# Patient Record
(demographics unavailable — no encounter records)

---

## 2017-07-20 NOTE — ED PDOC
HPI: Abdomen





<Cheyenne Vidal A - Last Filed: 07/20/17 23:45>


Chief Complaint (Provider): Abdominal Pain


History Per: Patient


History/Exam Limitations: no limitations


Onset/Duration Of Symptoms: Days (x1 )


Current Symptoms Are (Timing): Still Present





<Khai Garcia - Last Filed: 07/20/17 23:53>


Time Seen by Provider: 07/20/17 21:31


Chief Complaint (Nursing): Abdominal Pain


Additional Complaint(s): 





Helen Mendoza is a 41 year old female who presents to the emergency 

department with a complaint of pelvic pain associated with vaginal bleeding and 

headache since this morning. Denied fever, chills, urinary complaints, or 

taking medication for symptom relief.


Of note, patient is currently 7 weeks pregnant with history of normal, vaginal 

births. G3,P2. Patients bleeding was resolved upon arrival to the ED.





PMD: David Ley MD (Khai Garcia Y)





Past Medical History





<Cheyenne Vidal A - Last Filed: 07/20/17 23:45>


Reviewed: Historical Data, Nursing Documentation, Vital Signs





- Medical History


PMH: No Chronic Diseases


   Denies: Diabetes





- Surgical History


Surgical History: No Surg Hx





- Family History


Family History: States: Unknown Family Hx





<Khai Garcia Y - Last Filed: 07/20/17 23:53>


Vital Signs: 


 Last Vital Signs











Temp  98.1 F   07/20/17 21:19


 


Pulse  62   07/20/17 21:19


 


Resp  16   07/20/17 21:19


 


BP  114/54 L  07/20/17 21:19


 


Pulse Ox  100   07/20/17 21:57














- Home Medications


Home Medications: 


 Ambulatory Orders











 Medication  Instructions  Recorded


 


Prenatal Multivit/Folic Acid/I 1 tab PO DAILY 07/20/17





[Prenatal Plus]  














- Allergies


Allergies/Adverse Reactions: 


 Allergies











Allergy/AdvReac Type Severity Reaction Status Date / Time


 


No Known Allergies Allergy   Verified 06/06/16 23:27














Review of Systems


ROS Statement: Except As Marked, All Systems Reviewed And Found Negative


Constitutional: Negative for: Fever, Chills


Genitourinary Female: Positive for: Vaginal Bleeding, Pelvic Pain.  Negative for

: Dysuria, Hematuria


Neurological: Positive for: Headache





<Khai Garcia Y - Last Filed: 07/20/17 23:53>





Physical Exam





- Reviewed


Nursing Documentation Reviewed: Yes


Vital Signs Reviewed: Yes





- Physical Exam


Appears: Positive for: Well, Non-toxic, No Acute Distress


Head Exam: Positive for: ATRAUMATIC, NORMAL INSPECTION, NORMOCEPHALIC


Skin: Positive for: Normal Color


Cardiovascular/Chest: Positive for: Regular Rate, Rhythm


Respiratory: Positive for: Normal Breath Sounds.  Negative for: Crackles, Rales

, Rhonchi, Wheezing


Gastrointestinal/Abdominal: Positive for: Normal Exam, Bowel Sounds, Soft, 

Tenderness (pelvis)


Extremity: Positive for: Normal ROM.  Negative for: Tenderness, Pedal Edema


Neurologic/Psych: Positive for: Alert, CNs II-XII, Oriented





<Khai Garcia Y - Last Filed: 07/20/17 23:53>





- Laboratory Results


Result Diagrams: 


 07/20/17 21:53





 07/20/17 21:53





<Cheyenne Vidal - Last Filed: 07/20/17 23:45>





- Laboratory Results


Result Diagrams: 


 07/20/17 21:53





 07/20/17 21:53





- ECG


O2 Sat by Pulse Oximetry: 100 (RA)


Pulse Ox Interpretation: Normal





<Khai Garcia Y - Last Filed: 07/20/17 23:53>





Medical Decision Making





<Cheyenne Vidal A - Last Filed: 07/20/17 23:45>





<Khai Garcia Y - Last Filed: 07/20/17 23:53>


Medical Decision Making: 





Initial Impression: Vaginal bleeding; pregnancy





Initial Plan:


* Type and screen


* Beta-HCG


* Labs


* Urine C&S


* Admit to hospital


* Urinalysis


* US OB preg & transvag





--------------------------------------------------------------------------------

-----------------


Scribe Attestation:


Documented by Michelle Nicholson, acting as a scribe for Khai Garcia MD.





Provider Scribe Attestation:


All medical record entries made by the Scribe were at my direction and 

personally dictated by me. I have reviewed the chart and agree that the record 

accurately reflects my personal performance of the history, physical exam, 

medical decision making, and the department course for this patient. I have 

also personally directed, reviewed, and agree with the discharge instructions 

and disposition.





--------------------------------------------------------------------------------

------------------


[id number] Report


Addendum created by Hay Garza MD on 7/20/2017 11:28 PM Eastern Time ([place] & 

[place])


THIS REPORT CONTAINS FINDINGS THAT MAY BE CRITICAL TO PATIENT CARE. The


findings were verbally communicated via telephone conference with Khai Morales at


11:28 PM EDT on 7/20/2017. The findings were acknowledged and understood.


Initial Report created on 7/20/2017 11:26 PM Eastern Time ([place] & [place])





EXAM:


US Pregnancy, Transvaginal


CLINICAL HISTORY:


41 years old, female; Pain; Pregnancy complicated by abdominal or pelvic pain; 

Lower; First


trimester; Gestational age or lmp: 05/05/2016; Pregnant; Additional info: 

Pregnancy pain


TECHNIQUE:


Real-time transvaginal obstetrical ultrasound of the maternal pelvis and a 

first trimester pregnancy


with image documentation. Transvaginal imaging was used for better evaluation 

of the fetus and


adnexa.


COMPARISON:


No relevant prior studies available.


FINDINGS:


Gestation: Gestational sac. No yolk sac. Fetal pole. Crown-rump length of 1.6 cm

, correlating with


gestational age of 8 weeks 0 days. No fetal heartbeat detected.


Uterus/cervix: 0.6 x 0.3 x 0.5 cm collection along gestational sac, possibly 

subchorionic hemorrhage.


No cervical dilatation or effacement.


Ovaries: RIGHT ovary: Normal. LEFT ovary: 1.1 x 1.3 x 1.1 cm anechoic lesion. 

No adnexal


masses.


Free fluid: No significant free fluid.


IMPRESSION:


1. Findings compatible with fetal demise.


2. LEFT ovarian cyst.


3. Incidental/non-acute findings are described above





2330 Re-Evaluation:


Diagnosis: Fetal Demise. Patients cervix is closed, hemoglobin is stable, urine 

presents no infection, and the patient is RH positive. The patient has been 

given bleeding precautions and instructed to follow-up with OBGYN in two days. 

PT has also been made aware of ultrasound report.


2330: Discharge


Discussed results and plan with patient who expresses understanding. Counseling 

was provided regarding the diagnosis and prognosis. All questions answered and 

there is agreement with the plan to discharge home with instructions. Patient 

stable for discharge. Return if symptoms persist or worsen.


_________________________________________________________________________





Scribe Attestation:


Documented by Daniella Matamoros, acting as a scribe for Khai Garcia MD.





MD Scribe Attestation:


All medical record entries made by the Scribe were at my direction and 

personally dictated by me. I have reviewed the chart and agree that the record 

accurately reflects my personal performance of the history, physical exam, 

medical decision making, and the department course for this patient. I have 

also personally directed, reviewed, and agree with the discharge instructions 

and disposition.


 (Khai Garcia)





Disposition





<Cheyenne Vidal - Last Filed: 07/20/17 23:45>





- Patient ED Disposition


Is Patient to be Admitted: No





- Disposition


Disposition: Routine/Home


Disposition Time: 23:30





<Khai Garcia - Last Filed: 07/20/17 23:53>





- Clinical Impression


Clinical Impression: 


 Fetal demise








- Disposition


Condition: IMPROVED

## 2017-07-20 NOTE — US
EXAM:

  US Pregnancy, Transvaginal



CLINICAL HISTORY:

  41 years old, female; Pain; Pregnancy complicated by abdominal or pelvic 

pain; Lower; First trimester; Gestational age or lmp: 05/05/2016; Pregnant; 

Additional info: Pregnancy pain



TECHNIQUE:

  Real-time transvaginal obstetrical ultrasound of the maternal pelvis and a 

first trimester pregnancy with image documentation.  Transvaginal imaging was 

used for better evaluation of the fetus and adnexa.



COMPARISON:

  No relevant prior studies available.



FINDINGS:

  Gestation:  Gestational sac.  No yolk sac.  Fetal pole.  Crown-rump length of 

1.6 cm, correlating with gestational age of 8 weeks 0 days.  No fetal heartbeat 

detected.

  Uterus/cervix:  0.6 x 0.3 x 0.5 cm collection along gestational sac, possibly 

subchorionic hemorrhage.  No cervical dilatation or effacement.

  Ovaries:  RIGHT ovary: Normal.  LEFT ovary: 1.1 x 1.3 x 1.1 cm anechoic 

lesion.  No adnexal masses.

  Free fluid:  No significant free fluid.



IMPRESSION:     

1.  Findings compatible with fetal demise.  

2.  LEFT ovarian cyst.

3.  Incidental/non-acute findings are described above.

## 2017-07-22 NOTE — ED PDOC
HPI: Female  Pain


Time Seen by Provider: 17 16:54


Chief Complaint (Nursing): Female Genitourinary


Chief Complaint (Provider): vaginal bleeding


History Per: Patient


History/Exam Limitations: no limitations


Onset/Duration Of Symptoms: Days (4), Persistent, Worse Since (9am)


Quality Of Discomfort: Cramping


Associated Symptoms: denies: Fever, Chills, Nausea, Vomiting, Diarrhea, Urinary 

Symptoms


Additional Complaint(s): 





10wks pregnant with recent visits to ER for vaginal bleeding and pelvic pain. 

Had been told that she is having a miscarriage.


Today at 9am bleeding suddenly more heavy with clots, and associated with more 

severe pain


No lightheadedness. No fainting. Used 4 sanitary pads so far. Has not f/u with 

OB yet.





OB [place]





Past Medical History


Reviewed: Historical Data, Nursing Documentation, Vital Signs


Vital Signs: 





 Last Vital Signs











Temp  98.8 F   17 16:39


 


Pulse  75   17 16:39


 


Resp  16   17 16:39


 


BP  123/57 L  17 16:39


 


Pulse Ox  98   17 16:39














- Medical History


PMH: No Chronic Diseases





- Surgical History


Surgical History: No Surg Hx





- Family History


Family History: States: Unknown Family Hx





- Social History


Current smoker - smoking cessation education provided: No





- Home Medications


Home Medications: 


 Ambulatory Orders











 Medication  Instructions  Recorded


 


Prenatal Multivit/Folic Acid/I 1 tab PO DAILY 17





[Prenatal Plus]  


 


Ibuprofen [Motrin Tab] 600 mg PO Q8 PRN #60 tab 17














- Allergies


Allergies/Adverse Reactions: 


 Allergies











Allergy/AdvReac Type Severity Reaction Status Date / Time


 


No Known Allergies Allergy   Verified 16 23:27














Review of Systems


ROS Statement: Except As Marked, All Systems Reviewed And Found Negative (and 

as per HPI)


Gastrointestinal: Positive for: Abdominal Pain.  Negative for: Nausea, Vomiting

, Diarrhea


Genitourinary Female: Positive for: Vaginal Bleeding, Pelvic Pain





Physical Exam





- Reviewed


Nursing Documentation Reviewed: Yes


Vital Signs Reviewed: Yes





- Physical Exam


Appears: Positive for: Non-toxic, In Acute Distress


Head Exam: Positive for: ATRAUMATIC, NORMOCEPHALIC


Skin: Positive for: Warm, Dry.  Negative for: Pallor


Eye Exam: Positive for: EOMI, PERRL


ENT: Positive for: Normal ENT Inspection


Neck: Positive for: Painless ROM, Supple


Cardiovascular/Chest: Positive for: Regular Rate, Rhythm.  Negative for: Murmur


Respiratory: Negative for: Accessory Muscle Use, Respiratory Distress


Gastrointestinal/Abdominal: Positive for: Bowel Sounds, Soft, Tenderness.  

Negative for: Mass, Distended, Guarding


Pelvic Exam: Positive for: Active Bleeding, Blood (heavy with clots cervic open)

, Tender Uterus


Back: Positive for: Normal Inspection


Extremity: Negative for: Pedal Edema, Deformity


Neurologic/Psych: Positive for: Alert.  Negative for: Motor/Sensory Deficits





- Laboratory Results


Result Diagrams: 


 17 17:44





 17 17:44





- ECG


O2 Sat by Pulse Oximetry: 98





- Progress


ED Course And Treament: 





 EXAM:


US Pregnancy, Transvaginal


CLINICAL HISTORY:


41 years old female; Signs and symptoms; Lmp or gestational age (in weeks): 

Lmp. .; Other:


Heavy vag bleed and pregnant; Patient HX: Exam 2 days ago uploaded to [employer]; 

Additional info:


Heavy vaginal bleed R/O missed ab


TECHNIQUE:


Real-time transvaginal obstetrical ultrasound of the maternal pelvis and a 

first trimester pregnancy


with image documentation. Transvaginal imaging was used for better evaluation 

of the endometrial


contents and adnexa.


COMPARISON:


OB ultrasound dated 2017.


FINDINGS:


Uterus:


Uterus is anteverted. It is mildly enlarged and measures approximately 10.8 x 

6.4 x 6 cm.


The endometrium is thickened and heterogeneous and measures 21.8 mm. No 

significant vascularity


is seen by color flow ultrasound. Findings most likely represent blood clot. No 

findings to suggest


retained products of conception.


Cervix: Unremarkable


Gestation:


The previously noted intrauterine gestational sac and embryo are no longer seen 

consistent with


interval spontaneous .


Free fluid:


There is no free fluid in the cul-de-sac.


Ovaries/adnexa:


The ovaries were not visualized, obscured by bowel gas.


IMPRESSION:


Interval spontaneous  with evacuation of the gestational sac and embryo.


Thickened endometrium without evidence of increased vascularity. Findings 

consistent with blood


clot.


Ovaries not visualized.


Thank you for allowing us to participate in the care of your patient.


Dictated and Authenticated by: Oscar Pyle MD


2017 8:11 PM Eastern Time ([place] & [place])


 











Disposition





- Clinical Impression


Clinical Impression: 


 Miscarriage





Counseled Patient/Family Regarding: Studies Performed, Diagnosis, Need For 

Followup, Rx Given





- Disposition


[facility]errals[facility] 


[facility]


Disposition: Routine/Home


Disposition Time: 21:00


Condition: STABLE


Prescriptions: 


Ibuprofen [Motrin Tab] 600 mg PO Q8 PRN #60 tab


 PRN Reason: Pain, Moderate (4-7)


Instructions:  Spontaneous Miscarriage (ED)


Forms:  [facility] ED School/Work Excuse


Print Language: Azeri

## 2017-07-23 NOTE — US
PROCEDURE:  



HISTORY:

heavy vaginal bleed r/o missed ab



COMPARISON:

 2017 



TECHNIQUE:





FINDINGS:

Diffuse thickening of the endometrium measuring 22 millimeters 

without vascularity suggestive of hemorrhagic thrombus. No definite 

evidence of products of conception. No evidence of intrauterine 

gestational sac compatible with spontaneous . The ovaries are 

not visualized. There is no free fluid the pelvis.



IMPRESSION:

As above.

## 2018-02-19 NOTE — CP.PCM.HP
History of Present Illness





- History of Present Illness


History of Present Illness: 





This is a 42 year old female with  no significant past medical history who 

presents to the ED with the complaint of fever, chills, generalized abdominal 

pain, and weakness getting progressively worse. The patient states that the 

pain is crampy, severe 9/10 intensity, generalized, with watery green nonbloody 

diarrhea x 3 days. LMP 2/17/2018





In the ED, the patient was found to be febrile with a temp of 101.3, 

tachycardia of 114, appearing acutely ill. Labwork shows WBC of 10.3, Hg 11.0, 

plt count of 231, Neutrophilia 88.6%, neutrophil count of 9.1, Band neutrophils 

of 10%. CT scan abdomen and pelvis reveals diffuse mural thickening and fluid, 

consistent with pancolitis. Given the patient's vitals she was also diagnosed 

with sepsis. Patient to be admitted to telemetry for further workup. 





Present on Admission





- Present on Admission


Any Indicators Present on Admission: No


History of DVT/PE: No


History of Uncontrolled Diabetes: No





Review of Systems





- Review of Systems


Review of Systems: 





A 12 point review of systems was conducted and found to be negative other than 

what was documented in the HPI. 





Past Patient History





- Infectious Disease


Hx of Infectious Diseases: None





- Past Social History


Smoking Status: Light Smoker < 10 Cigarettes Daily





- PSYCHIATRIC


Hx Substance Use: No





- SURGICAL HISTORY


Hx Surgeries: No





- ANESTHESIA


Hx Anesthesia: No





Meds


Allergies/Adverse Reactions: 


 Allergies











Allergy/AdvReac Type Severity Reaction Status Date / Time


 


No Known Allergies Allergy   Verified 06/06/16 23:27














Physical Exam





- Additional Findings


Additional findings: 








Physical exam:





Constitutional- cooperative, awake, alert


Head- NCAT, PERRL


Eye- PERRL, EOMI


ENT- normal exam, MMM.


Neck- normal inspection, supple, no JVD


Respiratory- CTAB, no wheezes rales rhonchi


Cardiovascular- RRR, +S1, +S2 no MRG


GI/Abdominal- normal bowel sounds, soft, + Tender to palpation diffusely, 

protuberant, nondistended, no mass, no hsm


Skin- warm, dry


Extremities Exam- normal capillary refill, normal inspection


Neurological Exam- alert, awake, oriented


Psych- normal mood, normal affect





Results





- Vital Signs


Recent Vital Signs: 





 Last Vital Signs











Temp  99.5 F   02/19/18 19:11


 


Pulse  114 H  02/19/18 14:40


 


Resp  18   02/19/18 14:40


 


BP  138/85   02/19/18 14:40


 


Pulse Ox  98   02/19/18 19:00














- Labs


Result Diagrams: 


 02/19/18 16:01





 02/19/18 16:01


Labs: 





 Laboratory Results - last 24 hr











  02/19/18 02/19/18 02/19/18





  16:01 16:01 16:01


 


WBC  10.3  


 


RBC  4.42  


 


Hgb  11.0 L  


 


Hct  34.2  


 


MCV  77.2 L D  


 


MCH  25.0 L  


 


MCHC  32.3 L  


 


RDW  16.3 H  


 


Plt Count  231  


 


MPV  8.3  


 


Neut % (Auto)  88.6 H  


 


Lymph % (Auto)  7.3 L  


 


Mono % (Auto)  4.0  


 


Eos % (Auto)  0.0  


 


Baso % (Auto)  0.1  


 


Neut # (Auto)  9.1 H  


 


Lymph # (Auto)  0.8 L  


 


Mono # (Auto)  0.4  


 


Eos # (Auto)  0.0  


 


Baso # (Auto)  0.0  


 


Neutrophils % (Manual)  72  


 


Band Neutrophils %  10 H  


 


Lymphocytes % (Manual)  14 L  


 


Monocytes % (Manual)  3  


 


Eosinophils % (Manual)  1  


 


Toxic Granulation  Present  


 


Platelet Estimate  Normal  


 


Polychromasia  Slight  


 


Hypochromasia (manual)  Slight  


 


Poikilocytosis (manual  Slight  


 


Anisocytosis (manual)  Slight  


 


Spherocytes  Slight  


 


Target Cells  Slight  


 


Tear Drop Cells  Slight  


 


Ovalocytes  Slight  


 


Stomatocytes  Slight  


 


pO2   


 


VBG pH   


 


VBG pCO2   


 


VBG HCO3   


 


VBG Total CO2   


 


VBG O2 Sat (Calc)   


 


VBG Base Excess   


 


VBG Potassium   


 


Glucose   


 


Lactate   


 


FiO2   


 


Sodium   138 


 


Potassium   4.0 


 


Chloride   104 


 


Carbon Dioxide   22 


 


Anion Gap   16 


 


BUN   17 


 


Creatinine   0.9 


 


Est GFR ( Amer)   > 60 


 


Est GFR (Non-Af Amer)   > 60 


 


Random Glucose   124 H 


 


Calcium   9.0 


 


Total Bilirubin   0.6 


 


AST   21 


 


ALT   32 


 


Alkaline Phosphatase   76 


 


Total Protein   7.8 


 


Albumin   4.2 


 


Globulin   3.6 


 


Albumin/Globulin Ratio   1.2 


 


Lipase   37 


 


Venous Blood Potassium   


 


Urine Color   


 


Urine Clarity   


 


Urine pH   


 


Ur Specific Gravity   


 


Urine Protein   


 


Urine Glucose (UA)   


 


Urine Ketones   


 


Urine Blood   


 


Urine Nitrate   


 


Urine Bilirubin   


 


Urine Urobilinogen   


 


Ur Leukocyte Esterase   


 


Urine RBC (Auto)   


 


Urine Microscopic WBC   


 


Ur Squamous Epith Cells   


 


Urine Bacteria   


 


Hyaline Casts   


 


Granular Casts (Auto)   


 


Alcohol, Quantitative   < 10 


 


Influenza Typ A,B (EIA)    Negative for flu a/b














  02/19/18 02/19/18





  16:05 18:20


 


WBC  


 


RBC  


 


Hgb  


 


Hct  


 


MCV  


 


MCH  


 


MCHC  


 


RDW  


 


Plt Count  


 


MPV  


 


Neut % (Auto)  


 


Lymph % (Auto)  


 


Mono % (Auto)  


 


Eos % (Auto)  


 


Baso % (Auto)  


 


Neut # (Auto)  


 


Lymph # (Auto)  


 


Mono # (Auto)  


 


Eos # (Auto)  


 


Baso # (Auto)  


 


Neutrophils % (Manual)  


 


Band Neutrophils %  


 


Lymphocytes % (Manual)  


 


Monocytes % (Manual)  


 


Eosinophils % (Manual)  


 


Toxic Granulation  


 


Platelet Estimate  


 


Polychromasia  


 


Hypochromasia (manual)  


 


Poikilocytosis (manual  


 


Anisocytosis (manual)  


 


Spherocytes  


 


Target Cells  


 


Tear Drop Cells  


 


Ovalocytes  


 


Stomatocytes  


 


pO2   65 H


 


VBG pH   7.43


 


VBG pCO2   32 L


 


VBG HCO3   23.0


 


VBG Total CO2   22.2


 


VBG O2 Sat (Calc)   95.2 H


 


VBG Base Excess   -2.3 L


 


VBG Potassium   3.3 L


 


Glucose   113 H


 


Lactate   0.7


 


FiO2   21.0


 


Sodium   135.0


 


Potassium  


 


Chloride   107.0


 


Carbon Dioxide  


 


Anion Gap  


 


BUN  


 


Creatinine  


 


Est GFR ( Amer)  


 


Est GFR (Non-Af Amer)  


 


Random Glucose  


 


Calcium  


 


Total Bilirubin  


 


AST  


 


ALT  


 


Alkaline Phosphatase  


 


Total Protein  


 


Albumin  


 


Globulin  


 


Albumin/Globulin Ratio  


 


Lipase  


 


Venous Blood Potassium   3.3 L


 


Urine Color  Yellow 


 


Urine Clarity  Cloudy 


 


Urine pH  6.0 


 


Ur Specific Gravity  1.028 


 


Urine Protein  100 


 


Urine Glucose (UA)  Neg 


 


Urine Ketones  Negative 


 


Urine Blood  Moderate 


 


Urine Nitrate  Negative 


 


Urine Bilirubin  Negative 


 


Urine Urobilinogen  0.2-1.0 


 


Ur Leukocyte Esterase  Neg 


 


Urine RBC (Auto)  49 H 


 


Urine Microscopic WBC  4 


 


Ur Squamous Epith Cells  1 


 


Urine Bacteria  Rare 


 


Hyaline Casts  3-5 H 


 


Granular Casts (Auto)  2 


 


Alcohol, Quantitative  


 


Influenza Typ A,B (EIA)  














Assessment & Plan





- Assessment and Plan (Free Text)


Plan: 





ASSESSMENT/PLAN





1) Sepsis due to pancolitis


- Admit to telemetry


- Consultation with Dr. Welch, GI


- Regular diet


- IV fluids


- IV abx: Rocephin 1 gram IVPB q 12 hours and Flagyl 500 mg IVPB q 12 hours (

Vanc/Zosyn given in ED)


- Morphine PRN for abdominal pain


- Zofran PRN N/V


- Blood culture, UCX, Stool CX, WBC stool, C. diff toxin A&B


- Tylenol PRN for fever


- Repeat labs in AM


- F/u GI in AM





2) Obesity


BMI 34.9


Chronic





3) DVT prophylaxis


- Heparin SQ

## 2018-02-19 NOTE — CT
PROCEDURE:  CT Abdomen and Pelvis with contrast



HISTORY:

Epigastric,LLQ abd pain



COMPARISON:

Abdomen pelvis CT with contrast 02/09/2015.



TECHNIQUE:

Following the intravenous administration of iodinated contrast 

material, a CT examination of the abdomen and pelvis performed from 

the domes of the diaphragms to the symphysis pubis with reformatted 

datasets provided not only axial but also sagittal and coronal 

planes. Oral contrast was not administered as per referring physician 

request.



Contrast dose: Omnipaque 300, 95 cc



Radiation dose:



Total exam DLP = 1034.78 mGy-cm.



This CT exam was performed using one or more of the following dose 

reduction techniques: Automated exposure control, adjustment of the 

mA and/or kV according to patient size, and/or use of iterative 

reconstruction technique.



FINDINGS:



LOWER THORAX:

Heart appears prominent. No pleural or pericardial effusion 

identified. 



LIVER:

Diffuse fatty infiltration of the liver is appreciate without 

discrete mass. 



GALLBLADDER AND BILE DUCTS:

Trace radiodense cholelithiasis is now identified in the gallbladder 

lumen versus minimal anterior gallbladder wall calcification. 

Moderate gallbladder distention is appreciate without pericholecystic 

fluid collection or mural thickening. 



PANCREAS:

Unremarkable. No gross lesion or ductal dilatation.



SPLEEN:

Unremarkable. 



ADRENALS:

Right adrenal calcifications are again appreciated.  Left adrenal 

gland remains normal appearing. 



KIDNEYS AND URETERS:

Unremarkable. No hydronephrosis. No solid mass. 



VASCULATURE:

Unremarkable. No aortic aneurysm. 



BOWEL:

Lack of oral contrast administration limits evaluation of the bowel. 



The stomach is decompressed with limited residual fluid in the lumen. 

No bowel obstruction or definite mesenteric edema is appreciated 

including the pericolic spaces.  However, fluid is seen throughout 

the large bowel somewhat more so on the right than left colon 

segments and mural thickening is suspected throughout the colon in a 

pattern that likely reflects pan colitis.  Clinically correlate 

further. 



APPENDIX:

Normal appendix. 



PERITONEUM:

Unremarkable. No free fluid. No free air. 



LYMPH NODES:

Unremarkable. No enlarged lymph nodes. 



BLADDER:

Urinary bladder is only mildly distended with limited evaluation of 

the wall resulting. 



REPRODUCTIVE:

Unremarkable. 



BONES:

No acute fracture. 



OTHER FINDINGS:

None.



IMPRESSION:

Pancolitis were near pancolitis is suspected involving the colon. No 

abscess, free intraperitoneal gas or ascites. No definite perinephric 

fluid collection. Retained fluid is seen at the right greater than 

left hemicolon with suspected mural thickening throughout the large 

bowel as discussed above. The lack of oral contrast administration 

limits evaluation of the bowel. Further clinical correlation is 

advised.



Hepatic steatosis.



Trace cholelithiasis.



Stable right adrenal calcifications. No obvious right adrenal mass.

## 2018-02-19 NOTE — ED PDOC
HPI: Abdomen


Chief Complaint (Provider): Abdominal pain 


History Per: Patient


History/Exam Limitations: no limitations


Onset/Duration Of Symptoms: Days (7), Intermittent Episodes


Current Symptoms Are (Timing): Still Present


Pain Scale Rating Of: 9


Location Of Pain/Discomfort: Diffuse


Quality Of Discomfort: Cramping


Associated Symptoms: Fever, Nausea, Diarrhea.  denies: Vomiting


Exacerbating Factors: None


Alleviating Factors: None





<Oliver Savage - Last Filed: 02/19/18 19:00>





<Cheyenne Vidal - Last Filed: 02/20/18 13:25>


Time Seen by Provider: 02/19/18 14:48


Chief Complaint (Nursing): Fever


Additional Complaint(s): 





43 yo ,f, no significant PMHx presents to ED c/o diffuse generalized abdominal 

pain started 7 days ago, cramping, 9/10 intensity, not radiated, associated 

with non-bloddy greenish  large diarrhea 7-8 episodes/day for the last 3 days 

and since yesterday flu like symptoms: occipital headache, nausea, myalgia, 

fever 101.2, sore throat, dry cough. She denies vomiting, dysuria, hematuria,

chest pain, palpitation, dizziness. Pt took  motrin this  morning w/o relief of 

symptoms. Pt had 5 drinks on saturday, denies drugs. LMP: 2/17/18


.   (Oliver Savage)





Supervising Attending Note





- Supervising Attending Note


The Documented history was done by the: Physician Extender, Attending Physician


The documented physical exam was done by the: Physician Extender, Attending 

Physician


The documented procedures were done by the: Physician Extender, Attending 

Physician





- Attestation:


I have personally seen and examined this patient.: Yes


I have fully participated in the care of the patient.: Yes


I have reviewed all pertinent clinical information: Yes





<Cheyenne Vidal - Last Filed: 02/20/18 13:25>





Past Medical History





- Medical History


PMH: 


   Denies: Diabetes





- Family History


Family History: States: Unknown Family Hx





<Oliver Savage - Last Filed: 02/19/18 19:00>


Reviewed: Historical Data, Nursing Documentation, Vital Signs





- Medical History


PMH: No Chronic Diseases





- Surgical History


Surgical History: No Surg Hx





<Cheyenne Vidal - Last Filed: 02/20/18 13:25>


Vital Signs: 





 Last Vital Signs











Temp  98.5 F   02/20/18 12:45


 


Pulse  80   02/20/18 12:45


 


Resp  16   02/20/18 12:45


 


BP  106/60   02/20/18 12:45


 


Pulse Ox  100   02/20/18 12:45














- Home Medications


Home Medications: 


 Ambulatory Orders











 Medication  Instructions  Recorded


 


Ibuprofen [Motrin Tab] 400 mg PO PRN PRN 02/20/18














- Allergies


Allergies/Adverse Reactions: 


 Allergies











Allergy/AdvReac Type Severity Reaction Status Date / Time


 


No Known Allergies Allergy   Verified 06/06/16 23:27














Review of Systems


Constitutional: Positive for: Fever


Respiratory: Positive for: Cough


Gastrointestinal: Positive for: Nausea, Abdominal Pain, Diarrhea.  Negative for

: Vomiting


Genitourinary Female: Negative for: Dysuria, Hematuria


Neurological: Negative for: Weakness





<Oliver Savage - Last Filed: 02/19/18 19:00>


ROS Statement: Except As Marked, All Systems Reviewed And Found Negative





<Cheyenne Vidal A - Last Filed: 02/20/18 13:25>





Physical Exam





- Physical Exam


Appears: Positive for: In Acute Distress (mild distress from pain )


Head Exam: Positive for: ATRAUMATIC, NORMOCEPHALIC


Skin: Positive for: Normal Color


Eye Exam: Positive for: Normal appearance


ENT: Positive for: Normal ENT Inspection


Neck: Positive for: Normal


Cardiovascular/Chest: Positive for: Regular Rate, Rhythm.  Negative for: Edema, 

Murmur


Respiratory: Positive for: Normal Breath Sounds.  Negative for: Crackles, Rales

, Rhonchi


Gastrointestinal/Abdominal: Positive for: Bowel Sounds (normal ), Soft, 

Tenderness (epigastric TD and LLQ).  Negative for: Distended, Guarding, Rebound


Back: Positive for: Normal Inspection.  Negative for: L CVA Tenderness, R CVA 

Tenderness





<Oliver Savage - Last Filed: 02/19/18 19:00>





- Reviewed


Nursing Documentation Reviewed: Yes


Vital Signs Reviewed: Yes





- Physical Exam


Appears: Positive for: Uncomfortable


Neurologic/Psych: Positive for: Alert, Oriented





<Cheyenne Vidal A - Last Filed: 02/20/18 13:25>





- Laboratory Results


Result Diagrams: 


 02/19/18 16:01





 02/19/18 16:01





- ECG


O2 Sat by Pulse Oximetry: 98





<Oliver Savage - Last Filed: 02/19/18 19:00>





- Laboratory Results


Result Diagrams: 


 02/20/18 05:55





 02/20/18 05:55





<Cheyenne Vidal - Last Filed: 02/20/18 13:25>


Medical Decision Making: 





15:15 pm


Patient evaluated for abdominal pain x 7 days, diarrheas the last 3 days and 

flu symptoms started yesterday


Initial impression


Abdominal pain


Influenza


URI





Deferential


-Acute viral gastroenteritis


-Acute pancreatitis


-Acute Diverticulitis





Plan:


Labs


CBC, CMP, UA, Lipase, Influenza test, Urine preg


Imaging


CT Abd w IV contrast





Meds:


IV fluids, Zofran, Pepcid, Morphine 4mg, Tylenol 650 mg





18:11 pm


Patient evaluated, reports abdominal pain is getting better with morphine, but 

still present 3/10.


CBC: WBC normal but neutrohilia 88.6, Bands 10, toxic granulation. UA hematuria 

secondary to menses


Pending CT Abd results.


18:55


CT Abd: showed pancolitis. Trace cholelithiasis. 


 


 (Oliver Savage)





Disposition





- Disposition


Disposition Time: 19:00





<Oliver Savage - Last Filed: 02/19/18 19:00>





- Patient ED Disposition


Is Patient to be Admitted: Transfer of Care


Counseled Patient/Family Regarding: Studies Performed, Diagnosis





- Disposition


Disposition Time: 17:00


Patient Signed Over To: Milan Ozuna





<Cheyenne Vidal - Last Filed: 02/20/18 13:25>





- Clinical Impression


Clinical Impression: 


 Pancolitis








- Disposition


Condition: STABLE

## 2018-02-20 NOTE — CP.PCM.CON
History of Present Illness





- History of Present Illness


History of Present Illness: 





41 yo female coming to the ER after 3 days of abdominal pain and diarrhea. 

Symptoms started 3 days ago and having 7 green watery bowel movements daily.





Review of Systems





- Constitutional


Constitutional: Fever





- EENT


Eyes: absent: Blurred Vision


Ears: absent: Decreased Hearing


Nose/Mouth/Throat: absent: Epistaxis





- Cardiovascular


Cardiovascular: absent: Chest Pain





- Respiratory


Respiratory: absent: Cough





- Gastrointestinal


Gastrointestinal: As Per HPI





- Genitourinary


Genitourinary: absent: Change in Urinary Stream





Past Patient History





- Infectious Disease


Hx of Infectious Diseases: None





- Past Social History


Smoking Status: Light Smoker < 10 Cigarettes Daily





- PSYCHIATRIC


Hx Substance Use: No





- SURGICAL HISTORY


Hx Surgeries: No





- ANESTHESIA


Hx Anesthesia: No





Meds


Allergies/Adverse Reactions: 


 Allergies











Allergy/AdvReac Type Severity Reaction Status Date / Time


 


No Known Allergies Allergy   Verified 06/06/16 23:27














- Medications


Medications: 


 Current Medications





Acetaminophen (Tylenol 325mg Tab)  650 mg PO Q6 PRN


   PRN Reason: Fever >100.4 F


   Last Admin: 02/19/18 21:40 Dose:  650 mg


Heparin Sodium (Porcine) (Heparin)  5,000 units SC Q12 SHAQ


   PRN Reason: Protocol


   Last Admin: 02/19/18 21:41 Dose:  5,000 units


Metronidazole (Flagyl 500mg/100ml Ns)  100 mls @ 100 mls/hr IVPB Q12 SHAQ


   PRN Reason: Protocol


   Last Admin: 02/20/18 08:10 Dose:  100 mls/hr


Ceftriaxone Sodium 1 gm/ (Sodium Chloride)  100 mls @ 100 mls/hr IVPB DAILY [facility]


   PRN Reason: Protocol


Sodium Chloride (Sodium Chloride 0.9%)  1,000 mls @ 150 mls/hr IV .Q6H40M [facility]


   Stop: 02/20/18 19:43


   Last Admin: 02/20/18 05:33 Dose:  150 mls/hr


Morphine Sulfate (Morphine)  2 mg IVP Q4 PRN


   PRN Reason: Pain, severe (8-10)


   Last Admin: 02/20/18 05:50 Dose:  2 mg


Ondansetron HCl (Zofran Inj)  4 mg IVP Q6 PRN


   PRN Reason: Nausea/Vomiting


   Last Admin: 02/20/18 05:54 Dose:  4 mg











Physical Exam





- Constitutional


Additional comments: 





Uncomfortable





- Head Exam


Head Exam: ATRAUMATIC





- Eye Exam


Eye Exam: Normal appearance





- ENT Exam


ENT Exam: Mucous Membranes Moist





- Neck Exam


Neck exam: Positive for: Normal Inspection





- Respiratory Exam


Respiratory Exam: Clear to Auscultation Bilateral





- Cardiovascular Exam


Cardiovascular Exam: REGULAR RHYTHM, +S1, +S2





- GI/Abdominal Exam


GI & Abdominal Exam: Normal Bowel Sounds, Soft, Tenderness


Additional comments: 





generalized moderate tenderness 





Results





- Vital Signs


Recent Vital Signs: 


 Last Vital Signs











Temp  98.9 F   02/20/18 07:49


 


Pulse  76   02/20/18 07:49


 


Resp  16   02/20/18 07:49


 


BP  110/62   02/20/18 07:49


 


Pulse Ox  99   02/20/18 07:49














- Labs


Result Diagrams: 


 02/20/18 05:55





 02/20/18 05:55


Labs: 


 Laboratory Results - last 24 hr











  02/19/18 02/19/18 02/19/18





  16:01 16:01 16:01


 


WBC  10.3  


 


RBC  4.42  


 


Hgb  11.0 L  


 


Hct  34.2  


 


MCV  77.2 L D  


 


MCH  25.0 L  


 


MCHC  32.3 L  


 


RDW  16.3 H  


 


Plt Count  231  


 


MPV  8.3  


 


Neut % (Auto)  88.6 H  


 


Lymph % (Auto)  7.3 L  


 


Mono % (Auto)  4.0  


 


Eos % (Auto)  0.0  


 


Baso % (Auto)  0.1  


 


Neut # (Auto)  9.1 H  


 


Lymph # (Auto)  0.8 L  


 


Mono # (Auto)  0.4  


 


Eos # (Auto)  0.0  


 


Baso # (Auto)  0.0  


 


Neutrophils % (Manual)  72  


 


Band Neutrophils %  10 H  


 


Lymphocytes % (Manual)  14 L  


 


Monocytes % (Manual)  3  


 


Eosinophils % (Manual)  1  


 


Toxic Granulation  Present  


 


Platelet Estimate  Normal  


 


Polychromasia  Slight  


 


Hypochromasia (manual)  Slight  


 


Poikilocytosis (manual  Slight  


 


Anisocytosis (manual)  Slight  


 


Spherocytes  Slight  


 


Target Cells  Slight  


 


Tear Drop Cells  Slight  


 


Ovalocytes  Slight  


 


Stomatocytes  Slight  


 


pO2   


 


VBG pH   


 


VBG pCO2   


 


VBG HCO3   


 


VBG Total CO2   


 


VBG O2 Sat (Calc)   


 


VBG Base Excess   


 


VBG Potassium   


 


Glucose   


 


Lactate   


 


FiO2   


 


Sodium   138 


 


Potassium   4.0 


 


Chloride   104 


 


Carbon Dioxide   22 


 


Anion Gap   16 


 


BUN   17 


 


Creatinine   0.9 


 


Est GFR ( Amer)   > 60 


 


Est GFR (Non-Af Amer)   > 60 


 


Random Glucose   124 H 


 


Calcium   9.0 


 


Total Bilirubin   0.6 


 


AST   21 


 


ALT   32 


 


Alkaline Phosphatase   76 


 


Total Protein   7.8 


 


Albumin   4.2 


 


Globulin   3.6 


 


Albumin/Globulin Ratio   1.2 


 


Lipase   37 


 


Venous Blood Potassium   


 


Urine Color   


 


Urine Clarity   


 


Urine pH   


 


Ur Specific Gravity   


 


Urine Protein   


 


Urine Glucose (UA)   


 


Urine Ketones   


 


Urine Blood   


 


Urine Nitrate   


 


Urine Bilirubin   


 


Urine Urobilinogen   


 


Ur Leukocyte Esterase   


 


Urine RBC (Auto)   


 


Urine Microscopic WBC   


 


Ur Squamous Epith Cells   


 


Urine Bacteria   


 


Hyaline Casts   


 


Granular Casts (Auto)   


 


Alcohol, Quantitative   < 10 


 


Influenza Typ A,B (EIA)    Negative for flu a/b














  02/19/18 02/19/18 02/20/18





  16:05 18:20 05:55


 


WBC   


 


RBC   


 


Hgb   


 


Hct   


 


MCV   


 


MCH   


 


MCHC   


 


RDW   


 


Plt Count   


 


MPV   


 


Neut % (Auto)   


 


Lymph % (Auto)   


 


Mono % (Auto)   


 


Eos % (Auto)   


 


Baso % (Auto)   


 


Neut # (Auto)   


 


Lymph # (Auto)   


 


Mono # (Auto)   


 


Eos # (Auto)   


 


Baso # (Auto)   


 


Neutrophils % (Manual)   


 


Band Neutrophils %   


 


Lymphocytes % (Manual)   


 


Monocytes % (Manual)   


 


Eosinophils % (Manual)   


 


Toxic Granulation   


 


Platelet Estimate   


 


Polychromasia   


 


Hypochromasia (manual)   


 


Poikilocytosis (manual   


 


Anisocytosis (manual)   


 


Spherocytes   


 


Target Cells   


 


Tear Drop Cells   


 


Ovalocytes   


 


Stomatocytes   


 


pO2   65 H 


 


VBG pH   7.43 


 


VBG pCO2   32 L 


 


VBG HCO3   23.0 


 


VBG Total CO2   22.2 


 


VBG O2 Sat (Calc)   95.2 H 


 


VBG Base Excess   -2.3 L 


 


VBG Potassium   3.3 L 


 


Glucose   113 H 


 


Lactate   0.7 


 


FiO2   21.0 


 


Sodium   135.0  138


 


Potassium    3.2 L


 


Chloride   107.0  106


 


Carbon Dioxide    22


 


Anion Gap    13


 


BUN    10


 


Creatinine    0.7


 


Est GFR ( Amer)    > 60


 


Est GFR (Non-Af Amer)    > 60


 


Random Glucose    113 H


 


Calcium    8.0 L


 


Total Bilirubin   


 


AST   


 


ALT   


 


Alkaline Phosphatase   


 


Total Protein   


 


Albumin   


 


Globulin   


 


Albumin/Globulin Ratio   


 


Lipase   


 


Venous Blood Potassium   3.3 L 


 


Urine Color  Yellow  


 


Urine Clarity  Cloudy  


 


Urine pH  6.0  


 


Ur Specific Gravity  1.028  


 


Urine Protein  100  


 


Urine Glucose (UA)  Neg  


 


Urine Ketones  Negative  


 


Urine Blood  Moderate  


 


Urine Nitrate  Negative  


 


Urine Bilirubin  Negative  


 


Urine Urobilinogen  0.2-1.0  


 


Ur Leukocyte Esterase  Neg  


 


Urine RBC (Auto)  49 H  


 


Urine Microscopic WBC  4  


 


Ur Squamous Epith Cells  1  


 


Urine Bacteria  Rare  


 


Hyaline Casts  3-5 H  


 


Granular Casts (Auto)  2  


 


Alcohol, Quantitative   


 


Influenza Typ A,B (EIA)   














  02/20/18





  05:55


 


WBC  6.0


 


RBC  3.75 L


 


Hgb  9.1 L


 


Hct  28.9 L


 


MCV  77.0 L


 


MCH  24.3 L


 


MCHC  31.5 L


 


RDW  16.6 H


 


Plt Count  179


 


MPV 


 


Neut % (Auto) 


 


Lymph % (Auto) 


 


Mono % (Auto) 


 


Eos % (Auto) 


 


Baso % (Auto) 


 


Neut # (Auto) 


 


Lymph # (Auto) 


 


Mono # (Auto) 


 


Eos # (Auto) 


 


Baso # (Auto) 


 


Neutrophils % (Manual) 


 


Band Neutrophils % 


 


Lymphocytes % (Manual) 


 


Monocytes % (Manual) 


 


Eosinophils % (Manual) 


 


Toxic Granulation 


 


Platelet Estimate 


 


Polychromasia 


 


Hypochromasia (manual) 


 


Poikilocytosis (manual 


 


Anisocytosis (manual) 


 


Spherocytes 


 


Target Cells 


 


Tear Drop Cells 


 


Ovalocytes 


 


Stomatocytes 


 


pO2 


 


VBG pH 


 


VBG pCO2 


 


VBG HCO3 


 


VBG Total CO2 


 


VBG O2 Sat (Calc) 


 


VBG Base Excess 


 


VBG Potassium 


 


Glucose 


 


Lactate 


 


FiO2 


 


Sodium 


 


Potassium 


 


Chloride 


 


Carbon Dioxide 


 


Anion Gap 


 


BUN 


 


Creatinine 


 


Est GFR ( Amer) 


 


Est GFR (Non-Af Amer) 


 


Random Glucose 


 


Calcium 


 


Total Bilirubin 


 


AST 


 


ALT 


 


Alkaline Phosphatase 


 


Total Protein 


 


Albumin 


 


Globulin 


 


Albumin/Globulin Ratio 


 


Lipase 


 


Venous Blood Potassium 


 


Urine Color 


 


Urine Clarity 


 


Urine pH 


 


Ur Specific Gravity 


 


Urine Protein 


 


Urine Glucose (UA) 


 


Urine Ketones 


 


Urine Blood 


 


Urine Nitrate 


 


Urine Bilirubin 


 


Urine Urobilinogen 


 


Ur Leukocyte Esterase 


 


Urine RBC (Auto) 


 


Urine Microscopic WBC 


 


Ur Squamous Epith Cells 


 


Urine Bacteria 


 


Hyaline Casts 


 


Granular Casts (Auto) 


 


Alcohol, Quantitative 


 


Influenza Typ A,B (EIA) 














- Imaging and Cardiology


  ** CT scan - abdomen


Status: Report reviewed by me





Assessment & Plan


(1) Pancolitis


Assessment and Plan: 


CT with evidence of pancolitis. Likely based on presentation to be infectious. 

C. diff being tested for but is less likely due to normal WBC. For now broad 

spectrum antibiotics, IV hydration, and adequate analgesia.


Status: Acute

## 2018-02-20 NOTE — CP.PCM.PN
Subjective





- Date & Time of Evaluation


Date of Evaluation: 02/20/18


Time of Evaluation: 12:00





- Subjective


Subjective: 





Patient seen and examined at bedside. Reports continued abdominal pain; however 

nausea and diarrhea improved. Yesterday evening after I saw her her BP dropped 

to low systolic 90's and again had fever of 101, however BP came up with 

fluids. Since she has been afebrile. No other complaints by patient or as per 

nursing staff.








Objective





- Vital Signs/Intake and Output


Vital Signs (last 24 hours): 


 











Temp Pulse Resp BP Pulse Ox


 


 98.5 F   80   16   106/60   100 


 


 02/20/18 12:45  02/20/18 12:45  02/20/18 12:45  02/20/18 12:45  02/20/18 12:45











- Medications


Medications: 


 Current Medications





Acetaminophen (Tylenol 325mg Tab)  650 mg PO Q6 PRN


   PRN Reason: Fever >100.4 F


   Last Admin: 02/19/18 21:40 Dose:  650 mg


Heparin Sodium (Porcine) (Heparin)  5,000 units SC Q12 [facility]


   PRN Reason: Protocol


   Last Admin: 02/20/18 09:40 Dose:  5,000 units


Metronidazole (Flagyl 500mg/100ml Ns)  100 mls @ 100 mls/hr IVPB Q12 [facility]


   PRN Reason: Protocol


   Last Admin: 02/20/18 08:10 Dose:  100 mls/hr


Ceftriaxone Sodium 1 gm/ (Sodium Chloride)  100 mls @ 100 mls/hr IVPB DAILY [facility]


   PRN Reason: Protocol


   Last Admin: 02/20/18 09:39 Dose:  100 mls/hr


Sodium Chloride (Sodium Chloride 0.9%)  1,000 mls @ 150 mls/hr IV .Q6H40M [facility]


   Stop: 02/20/18 19:43


   Last Admin: 02/20/18 10:12 Dose:  150 mls/hr


Morphine Sulfate (Morphine)  2 mg IVP Q4 PRN


   PRN Reason: Pain, severe (8-10)


   Last Admin: 02/20/18 14:29 Dose:  2 mg


Ondansetron HCl (Zofran Inj)  4 mg IVP Q6 PRN


   PRN Reason: Nausea/Vomiting


   Last Admin: 02/20/18 05:54 Dose:  4 mg











- Labs


Labs: 


 





 02/20/18 05:55 





 02/20/18 05:55 











- Additional Findings


Additional findings: 





Physical exam:





Constitutional- cooperative, awake, alert


Head- NCAT, PERRL


Eye- PERRL, EOMI


ENT- normal exam, MMM.


Neck- normal inspection, supple, no JVD


Respiratory- CTAB, no wheezes rales rhonchi


Cardiovascular- RRR, +S1, +S2 no MRG


GI/Abdominal- normal bowel sounds, soft, + Tender to palpation diffusely, 

protuberant, nondistended, no mass, no hsm


Skin- warm, dry


Extremities Exam- normal capillary refill, normal inspection


Neurological Exam- alert, awake, oriented


Psych- normal mood, normal affect








Assessment and Plan





- Assessment and Plan (Free Text)


Plan: 











ASSESSMENT/PLAN





1) Sepsis due to pancolitis- slowly improving, afebrile since yesterday, still 

symptomatic wtih abdominal pain. BP intermittently dropped with fever last night


- continue monitoring on tele


- Dr. Welch: continue current management


- Regular diet


- IV fluids


- IV abx: Rocephin 1 gram IVPB q 12 hours and Flagyl 500 mg IVPB q 12 hours (

Vanc/Zosyn given in ED)


- Morphine PRN for abdominal pain


- Zofran PRN N/V


- C. diff toxin negative


- Influenza negative


- Awaiting rest of cultures


- Tylenol PRN for fever


- Repeat labs in AM


- WBC 10.3 -> 6.0





2) Obesity


BMI 34.9


Chronic





3) Anemia- likely dilutional drop


- 11.0 -> 9.1


- HD stable now


- Continue to monitor, recheck in AM


- no evidence of Gi bleed


- moderate blood in urinalysis due to her period





4) Hypokalemia


- 4.0 -> 3.2


- Dilutional


- Replete PO


- recheck in AM





5) DVT prophylaxis


- Heparin SQ

## 2018-02-21 NOTE — CP.PCM.DIS
Provider





- Provider


Date of Admission: 


02/19/18 18:59





Attending physician: 


Isaias Hammond DO





Primary care physician: 





none


Consults: 





Dr. Raheem Welch


Time Spent in preparation of Discharge (in minutes): 25





Hospital Course





- Lab Results


Lab Results: 


 Micro Results





02/19/18 18:20   Blood-Venous   Blood Culture - Preliminary


                            NO GROWTH AFTER 24 HOURS





 Most Recent Lab Values











WBC  4.2 K/uL (4.8-10.8)  L  02/21/18  05:35    


 


RBC  3.40 Mil/uL (3.80-5.20)  L  02/21/18  05:35    


 


Hgb  8.4 g/dL (12.0-16.0)  L  02/21/18  05:35    


 


Hct  26.1 % (34.0-47.0)  L  02/21/18  05:35    


 


MCV  76.8 fl (81.0-99.0)  L  02/21/18  05:35    


 


MCH  24.7 pg (27.0-31.0)  L  02/21/18  05:35    


 


MCHC  32.2 g/dL (33.0-37.0)  L  02/21/18  05:35    


 


RDW  16.4 % (11.5-14.5)  H  02/21/18  05:35    


 


Plt Count  150 K/uL (130-400)   02/21/18  05:35    


 


MPV  8.3 fl (7.2-11.7)   02/19/18  16:01    


 


Neut % (Auto)  88.6 % (50.0-75.0)  H  02/19/18  16:01    


 


Lymph % (Auto)  7.3 % (20.0-40.0)  L  02/19/18  16:01    


 


Mono % (Auto)  4.0 % (0.0-10.0)   02/19/18  16:01    


 


Eos % (Auto)  0.0 % (0.0-4.0)   02/19/18  16:01    


 


Baso % (Auto)  0.1 % (0.0-2.0)   02/19/18  16:01    


 


Neut # (Auto)  9.1 K/uL (1.8-7.0)  H  02/19/18  16:01    


 


Lymph # (Auto)  0.8 K/uL (1.0-4.3)  L  02/19/18  16:01    


 


Mono # (Auto)  0.4 K/uL (0.0-0.8)   02/19/18  16:01    


 


Eos # (Auto)  0.0 K/uL (0.0-0.7)   02/19/18  16:01    


 


Baso # (Auto)  0.0 K/uL (0.0-0.2)   02/19/18  16:01    


 


Neutrophils % (Manual)  72 % (42-75)   02/19/18  16:01    


 


Band Neutrophils %  10 % (0-2)  H  02/19/18  16:01    


 


Lymphocytes % (Manual)  14 % (20-50)  L  02/19/18  16:01    


 


Monocytes % (Manual)  3 % (0-10)   02/19/18  16:01    


 


Eosinophils % (Manual)  1 % (0-7)   02/19/18  16:01    


 


Toxic Granulation  Present   02/19/18  16:01    


 


Platelet Estimate  Normal  (NORMAL)   02/19/18  16:01    


 


Polychromasia  Slight   02/19/18  16:01    


 


Hypochromasia (manual)  Slight   02/19/18  16:01    


 


Poikilocytosis (manual  Slight   02/19/18  16:01    


 


Anisocytosis (manual)  Slight   02/19/18  16:01    


 


Spherocytes  Slight   02/19/18  16:01    


 


Target Cells  Slight   02/19/18  16:01    


 


Tear Drop Cells  Slight   02/19/18  16:01    


 


Ovalocytes  Slight   02/19/18  16:01    


 


Stomatocytes  Slight   02/19/18  16:01    


 


pO2  65 mm/Hg (30-55)  H  02/19/18  18:20    


 


VBG pH  7.43  (7.32-7.43)   02/19/18  18:20    


 


VBG pCO2  32 mmHg (40-60)  L  02/19/18  18:20    


 


VBG HCO3  23.0 mmol/L  02/19/18  18:20    


 


VBG Total CO2  22.2 mmol/L (22-28)   02/19/18  18:20    


 


VBG O2 Sat (Calc)  95.2 % (40-65)  H  02/19/18  18:20    


 


VBG Base Excess  -2.3 mmol/L (0.0-2.0)  L  02/19/18  18:20    


 


VBG Potassium  3.3 mmol/L (3.6-5.2)  L  02/19/18  18:20    


 


Sodium  135.0 mmol/L (132-148)   02/19/18  18:20    


 


Chloride  107.0 mmol/L ()   02/19/18  18:20    


 


Glucose  113 mg/dL ()  H  02/19/18  18:20    


 


Lactate  0.7 mmol/L (0.7-2.1)   02/19/18  18:20    


 


FiO2  21.0 %  02/19/18  18:20    


 


Sodium  142 mmol/l (132-148)   02/21/18  05:35    


 


Potassium  3.6 MMOL/L (3.6-5.0)   02/21/18  05:35    


 


Chloride  110 mmol/L ()  H  02/21/18  05:35    


 


Carbon Dioxide  21 mmol/L (22-30)  L  02/21/18  05:35    


 


Anion Gap  15  (10-20)   02/21/18  05:35    


 


BUN  6 mg/dl (7-17)  L  02/21/18  05:35    


 


Creatinine  0.6 mg/dl (0.7-1.2)  L  02/21/18  05:35    


 


Est GFR ( Amer)  > 60   02/21/18  05:35    


 


Est GFR (Non-Af Amer)  > 60   02/21/18  05:35    


 


Random Glucose  103 mg/dL ()   02/21/18  05:35    


 


Calcium  7.9 mg/dL (8.4-10.2)  L  02/21/18  05:35    


 


Total Bilirubin  0.6 mg/dl (0.2-1.3)   02/19/18  16:01    


 


AST  21 U/L (14-36)   02/19/18  16:01    


 


ALT  32 U/L (9-52)   02/19/18  16:01    


 


Alkaline Phosphatase  76 U/L ()   02/19/18  16:01    


 


Total Protein  7.8 G/DL (6.3-8.2)   02/19/18  16:01    


 


Albumin  4.2 g/dL (3.5-5.0)   02/19/18  16:01    


 


Globulin  3.6 gm/dL (2.2-3.9)   02/19/18  16:01    


 


Albumin/Globulin Ratio  1.2  (1.0-2.1)   02/19/18  16:01    


 


Lipase  37 U/L ()   02/19/18  16:01    


 


Venous Blood Potassium  3.3 mmol/L (3.6-5.2)  L  02/19/18  18:20    


 


Urine Color  Yellow  (YELLOW)   02/19/18  16:05    


 


Urine Clarity  Cloudy  (Clear)   02/19/18  16:05    


 


Urine pH  6.0  (5.0-8.0)   02/19/18  16:05    


 


Ur Specific Gravity  1.028  (1.003-1.030)   02/19/18  16:05    


 


Urine Protein  100 mg/dL (NEGATIVE)   02/19/18  16:05    


 


Urine Glucose (UA)  Neg mg/dL (Normal)   02/19/18  16:05    


 


Urine Ketones  Negative mg/dL (NEGATIVE)   02/19/18  16:05    


 


Urine Blood  Moderate  (NEGATIVE)   02/19/18  16:05    


 


Urine Nitrate  Negative  (NEGATIVE)   02/19/18  16:05    


 


Urine Bilirubin  Negative  (NEGATIVE)   02/19/18  16:05    


 


Urine Urobilinogen  0.2-1.0 mg/dL (0.2-1.0)   02/19/18  16:05    


 


Ur Leukocyte Esterase  Neg Chika/uL (Negative)   02/19/18  16:05    


 


Urine RBC (Auto)  49 /hpf (0-3)  H  02/19/18  16:05    


 


Urine Microscopic WBC  4 /hpf (0-5)   02/19/18  16:05    


 


Ur Squamous Epith Cells  1 /hpf (0-5)   02/19/18  16:05    


 


Urine Bacteria  Rare  (<OCC)   02/19/18  16:05    


 


Hyaline Casts  3-5 /hpf (0-2)  H  02/19/18  16:05    


 


Granular Casts (Auto)  2 /lpf (0-1)   02/19/18  16:05    


 


Stool Leukocytes, Qual  Positive  (NEGATIVE)  H  02/20/18  01:30    


 


Alcohol, Quantitative  < 10 mg/dl (0-10)   02/19/18  16:01    


 


C. difficile Ag & Toxin  Negative  (NEGATIVE)   02/20/18  01:30    


 


Influenza Typ A,B (EIA)  Negative for flu a/b  (NEGATIVE)   02/19/18  16:01    














- Hospital Course


Hospital Course: 








This is a 42 year old female with  no significant past medical history who 

presented to the ED on 2/19/2018 with the complaint of fever, chills, 

generalized abdominal pain, and weakness getting progressively worse. The 

patient stated that the pain is crampy, severe 9/10 intensity, generalized, 

with watery green nonbloody diarrhea x 3 days. LMP 2/17/2018.





In the ED, the patient was found to be febrile with a temp of 101.3, 

tachycardia of 114, appearing acutely ill. Labwork shows WBC of 10.3, Hg 11.0, 

plt count of 231, Neutrophilia 88.6%, neutrophil count of 9.1, Band neutrophils 

of 10%. CT scan abdomen and pelvis reveals diffuse mural thickening and fluid, 

consistent with pancolitis. Given the patient's vitals she was also diagnosed 

with sepsis. Patient was admitted to telemetry for further workup. 





During her hospital stay, her tachycardia resolved with IV fluids and 

antibiotics. She was given Rocephin and Flagyl. Her abdominal pain improved 

somewhat but is still present. She was seen by Dr. Welch who recommended 

continuing the current management. Today her WBC is improved, afebrile, 

normotensive at this time. Being discharged today in stable condition. Dr. Welch recommends discharge today as well as per his note. 





1) Sepsis due to pancolitis- improved


- d/c today to home with antibiotics


- Dr. Welch: ok to discharge on antibiotics. Will give information to pt 

for f/u


- Script for percocet for pain control (5 day supply)


- Regular diet


- C. diff toxin negative


- Influenza negative


- Stool leukocytosis +


- Blood culture (-) after 24 hours


- Tylenol PRN for fever


- WBC 10.3 -> 6.0->4.2





2) Obesity


BMI 34.9


Chronic





3) Anemia- stabilizing


- likely because of dilutional and due to having her period


- 11.0 -> 9.1->8.4


- HD stable now


- no evidence of Gi bleed


- moderate blood in urinalysis due to her period





4) Hypokalemia- resolved


- 4.0 -> 3.6


- Dilutional





Dispo: F/u with PMD of her choice. Information given for f/u Dr. Welch. 

Return to ED for worsening of symptoms. Take antibiotics and pain medication as 

prescribed. 





Discharge Exam





- Head Exam


Head Exam: ATRAUMATIC





- Additional Findings


Additional findings: 





Physical exam:





Constitutional- cooperative, awake, alert


Head- NCAT, PERRL


Eye- PERRL, EOMI


ENT- normal exam, MMM.


Neck- normal inspection, supple, no JVD


Respiratory- CTAB, no wheezes rales rhonchi


Cardiovascular- RRR, +S1, +S2 no MRG


GI/Abdominal- normal bowel sounds, soft, tender to palpation but improved since 

admission. Protuberant, nondistended, no mass, no hsm


Skin- warm, dry


Extremities Exam- normal capillary refill, normal inspection


Neurological Exam- alert, awake, oriented


Psych- normal mood, normal affect





Discharge Plan





- Discharge Medications


Prescriptions: 


Ciprofloxacin HCl [Cipro] 500 mg PO Q12H 7 Days #14 tablet


Metronidazole [Flagyl] 500 mg PO TID 7 Days #21 tablet


oxyCODONE/Acetaminophen [Percocet 5/325 mg Tab] 1 ea PO Q6H #20 tab





- Follow Up Plan


Condition: STABLE


Disposition: HOME/ ROUTINE


Instructions:  Acute Abdomen (Belly Pain), Adult (DC)


Referrals: 


Raheem Welch MD [Staff Provider] -

## 2018-02-21 NOTE — CP.PCM.PN
Subjective





- Date & Time of Evaluation


Date of Evaluation: 02/21/18


Time of Evaluation: 08:54





- Subjective


Subjective: 





Pain present but improved. Tolerating diet.





Objective





- Vital Signs/Intake and Output


Vital Signs (last 24 hours): 


 











Temp Pulse Resp BP Pulse Ox


 


 98 F   77   20   104/70   99 


 


 02/21/18 07:55  02/21/18 07:55  02/21/18 07:55  02/21/18 07:55  02/21/18 07:55











- Medications


Medications: 


 Current Medications





Acetaminophen (Tylenol 325mg Tab)  650 mg PO Q6 PRN


   PRN Reason: Fever >100.4 F


   Last Admin: 02/19/18 21:40 Dose:  650 mg


Diphenhydramine HCl (Benadryl)  25 mg PO Q6 PRN


   PRN Reason: Itching / Pruritus


   Last Admin: 02/20/18 22:14 Dose:  25 mg


Heparin Sodium (Porcine) (Heparin)  5,000 units SC Q12 SHAQ


   PRN Reason: Protocol


   Last Admin: 02/20/18 20:49 Dose:  5,000 units


Metronidazole (Flagyl 500mg/100ml Ns)  100 mls @ 100 mls/hr IVPB Q12 SHAQ


   PRN Reason: Protocol


   Last Admin: 02/20/18 22:23 Dose:  100 mls/hr


Ceftriaxone Sodium 1 gm/ (Sodium Chloride)  100 mls @ 100 mls/hr IVPB DAILY SHAQ


   PRN Reason: Protocol


   Last Admin: 02/20/18 09:39 Dose:  100 mls/hr


Morphine Sulfate (Morphine)  2 mg IVP Q4 PRN


   PRN Reason: Pain, severe (8-10)


   Last Admin: 02/20/18 14:29 Dose:  2 mg


Ondansetron HCl (Zofran Inj)  4 mg IVP Q6 PRN


   PRN Reason: Nausea/Vomiting


   Last Admin: 02/20/18 05:54 Dose:  4 mg











- Labs


Labs: 


 





 02/21/18 05:35 





 02/21/18 05:35 











- Head Exam


Head Exam: ATRAUMATIC





- Eye Exam


Eye Exam: Normal appearance





- ENT Exam


ENT Exam: Mucous Membranes Moist





- Respiratory Exam


Respiratory Exam: Clear to Ausculation Bilateral





- GI/Abdominal Exam


GI & Abdominal Exam: Soft, Tenderness, Normal Bowel Sounds


Additional comments: 





mild periumbillical tenderness





Assessment and Plan


(1) Pancolitis


Assessment & Plan: 


Clinically better with improved WBC, no fever, and tolerating diet. May 

discharge on flagyl/cipro to complete 7 day course.


Status: Acute